# Patient Record
(demographics unavailable — no encounter records)

---

## 2024-12-26 NOTE — PHYSICAL EXAM
[No Acute Distress] : no acute distress [Normal Sclera/Conjunctiva] : normal sclera/conjunctiva [EOMI] : extraocular movements intact [Normal Outer Ear/Nose] : the outer ears and nose were normal in appearance [Normal Oropharynx] : the oropharynx was normal [Normal TMs] : both tympanic membranes were normal [No JVD] : no jugular venous distention [Coordination Grossly Intact] : coordination grossly intact [Normal] : normal rate, regular rhythm, normal S1 and S2 and no murmur heard [Normal Affect] : the affect was normal [Alert and Oriented x3] : oriented to person, place, and time [Normal Insight/Judgement] : insight and judgment were intact [de-identified] : +injected nasal turbinates

## 2024-12-26 NOTE — HISTORY OF PRESENT ILLNESS
[FreeTextEntry1] : 43 yo male w/ throat discomfort and losing his voice states 4 and 5 yo sons have been getting sick and coughed in his mouth inquiring about "getting something to knock it out" states takes atenolol for aortic issue, but not an aneurysm, mentions 4.1 nonsmoker

## 2025-03-06 NOTE — PLAN
[FreeTextEntry1] : All problems, medications and allergies were assessed and reviewed with the patient. The patients' blood was drawn in office and will be followed and evaluated for any issues. Patient was told to notify the office if any issues arise. Patient agreeable with plan   Continue follow up with specialties

## 2025-03-06 NOTE — HISTORY OF PRESENT ILLNESS
[FreeTextEntry1] : check up  [de-identified] : 03/04/2025  44 year  old male  presents for check up PMH: proteinuria  hx of MVP  PSH: Jaw surgery, ACL repair .  doing shoulder PT_  Social hx: , two children, never a smoker ,  Fam hx:    father: aortic anuerysm ,  Allergies: NKDA  Meds: Meds: atenolol 25am - saw Dr Landry Vigil -[facility]- Reports as a kid had MVP and states grew out of it ENT Dr Kerry Barnes - Supplements: multivitamin, vit C , beet chews-   Health Maintenance: Immunizations:   Covid vaccines- J&J - states had covid multiple times was mild

## 2025-03-06 NOTE — HEALTH RISK ASSESSMENT
[0] : 2) Feeling down, depressed, or hopeless: Not at all (0) [HIV test declined] : HIV test declined [Hepatitis C test declined] : Hepatitis C test declined [With Significant Other] : lives with significant other [With Family] : lives with family [Employed] : employed [] :  [# Of Children ___] : has [unfilled] children [Sexually Active] : sexually active [Feels Safe at Home] : Feels safe at home [Fully functional (bathing, dressing, toileting, transferring, walking, feeding)] : Fully functional (bathing, dressing, toileting, transferring, walking, feeding) [Fully functional (using the telephone, shopping, preparing meals, housekeeping, doing laundry, using] : Fully functional and needs no help or supervision to perform IADLs (using the telephone, shopping, preparing meals, housekeeping, doing laundry, using transportation, managing medications and managing finances) [Smoke Detector] : smoke detector [Carbon Monoxide Detector] : carbon monoxide detector [Safety elements used in home] : safety elements used in home [Seat Belt] :  uses seat belt [Good] : ~his/her~  mood as  good [Yes] : Yes [Monthly or less (1 pt)] : Monthly or less (1 point) [1 or 2 (0 pts)] : 1 or 2 (0 points) [No] : In the past 12 months have you used drugs other than those required for medical reasons? 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   [de-identified] : has derm - [facility]- will go back  [FreeTextEntry4] :  EC- Wife Jolly Almonte .

## 2025-03-06 NOTE — HISTORY OF PRESENT ILLNESS
[FreeTextEntry1] : check up  [de-identified] : 03/04/2025  44 year  old male  presents for check up PMH: proteinuria  hx of MVP  PSH: Jaw surgery, ACL repair .  doing shoulder PT_  Social hx: , two children, never a smoker ,  Fam hx:    father: aortic anuerysm ,  Allergies: NKDA  Meds: Meds: atenolol 25am - saw Dr Landry Vigil -[facility]- Reports as a kid had MVP and states grew out of it ENT Dr Kerry Barnes - Supplements: multivitamin, vit C , beet chews-   Health Maintenance: Immunizations:   Covid vaccines- J&J - states had covid multiple times was mild

## 2025-03-06 NOTE — HEALTH RISK ASSESSMENT
[0] : 2) Feeling down, depressed, or hopeless: Not at all (0) [HIV test declined] : HIV test declined [Hepatitis C test declined] : Hepatitis C test declined [With Significant Other] : lives with significant other [With Family] : lives with family [Employed] : employed [] :  [# Of Children ___] : has [unfilled] children [Sexually Active] : sexually active [Feels Safe at Home] : Feels safe at home [Fully functional (bathing, dressing, toileting, transferring, walking, feeding)] : Fully functional (bathing, dressing, toileting, transferring, walking, feeding) [Fully functional (using the telephone, shopping, preparing meals, housekeeping, doing laundry, using] : Fully functional and needs no help or supervision to perform IADLs (using the telephone, shopping, preparing meals, housekeeping, doing laundry, using transportation, managing medications and managing finances) [Smoke Detector] : smoke detector [Carbon Monoxide Detector] : carbon monoxide detector [Safety elements used in home] : safety elements used in home [Seat Belt] :  uses seat belt [Good] : ~his/her~  mood as  good [Yes] : Yes [Monthly or less (1 pt)] : Monthly or less (1 point) [1 or 2 (0 pts)] : 1 or 2 (0 points) [No] : In the past 12 months have you used drugs other than those required for medical reasons? 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   [de-identified] : has derm - [facility]- will go back  [FreeTextEntry4] :  [employer]- Wife Jolly Almonte .